# Patient Record
Sex: FEMALE | Race: WHITE
[De-identification: names, ages, dates, MRNs, and addresses within clinical notes are randomized per-mention and may not be internally consistent; named-entity substitution may affect disease eponyms.]

---

## 2019-03-19 PROBLEM — Z00.00 ENCOUNTER FOR PREVENTIVE HEALTH EXAMINATION: Status: ACTIVE | Noted: 2019-03-19

## 2019-08-13 ENCOUNTER — APPOINTMENT (OUTPATIENT)
Dept: ENDOCRINOLOGY | Facility: CLINIC | Age: 74
End: 2019-08-13
Payer: MEDICARE

## 2019-08-13 VITALS
DIASTOLIC BLOOD PRESSURE: 72 MMHG | WEIGHT: 184 LBS | BODY MASS INDEX: 33.86 KG/M2 | SYSTOLIC BLOOD PRESSURE: 128 MMHG | HEIGHT: 62 IN | HEART RATE: 92 BPM

## 2019-08-13 DIAGNOSIS — N28.9 DISORDER OF KIDNEY AND URETER, UNSPECIFIED: ICD-10-CM

## 2019-08-13 LAB
CREAT SERPL-MCNC: 1.21
GLUCOSE SERPL-MCNC: 98

## 2019-08-13 PROCEDURE — 99213 OFFICE O/P EST LOW 20 MIN: CPT

## 2019-08-13 NOTE — HISTORY OF PRESENT ILLNESS
[FreeTextEntry1] : Patient presents today for routine follow-up of hypothyroidism and thyroid nodules.\par Duration: 15 years\par Onset: found on routine blood work\par Severity: moderate\par Modifying factors: better with medication\par \par Current regimen:\par LT4 88 mcg daily\par Takes daily in AM with HCTZ.\par Nephrology just increased HCTZ from 12.5 mg daily to 25 mg daily due to increased swelling in B/L feet.\par \par Denies anterior neck pain, dysphagia, and voice changes.\par US done at PCP office (Dr Klein) in May 2017 showed right 3 mm hyperechoic nodule, multiple cystic nodules (largest on the left measuring 1.1 cm).\par Repeat ultrasound in our office on 1/28/2018: markedly heterogenous gland consistent with AITD; no discrete nodules.

## 2019-08-13 NOTE — ASSESSMENT
[FreeTextEntry1] : 73 year old female with hypothyroidism and history of thyroid nodules. She is currently euthyroid on replacement T4. Continue current dose of levothyroxine. Repeat TFTs and follow-up in office in one year. No need for repeat sonogram as most recent study did not show any discrete nodules.

## 2019-08-13 NOTE — REVIEW OF SYSTEMS
[Lower Ext Edema] : lower extremity edema [Shortness Of Breath] : shortness of breath [Swelling] : swelling [Recent Weight Gain (___ Lbs)] : recent [unfilled] ~Ulb weight gain [Dysphagia] : no dysphagia [Neck Pain] : no neck pain [Dysphonia] : no dysphonia [Chest Pain] : no chest pain [Palpitations] : no palpitations [Constipation] : no constipation [Diarrhea] : no diarrhea [Joint Pain] : no joint pain [Muscle Weakness] : no muscle weakness [Muscle Cramps] : no muscle cramps [Myalgia] : no myalgia  [Hair Loss] : no hair loss [Dry Skin] : no dry skin [Tremors] : no tremors [Anxiety] : no anxiety [Depression] : no depression [Cold Intolerance] : cold tolerant [Heat Intolerance] : heat tolerant

## 2019-08-27 ENCOUNTER — RECORD ABSTRACTING (OUTPATIENT)
Age: 74
End: 2019-08-27

## 2019-08-27 DIAGNOSIS — Z80.42 FAMILY HISTORY OF MALIGNANT NEOPLASM OF PROSTATE: ICD-10-CM

## 2019-08-27 DIAGNOSIS — N95.2 POSTMENOPAUSAL ATROPHIC VAGINITIS: ICD-10-CM

## 2019-08-27 DIAGNOSIS — Z87.891 PERSONAL HISTORY OF NICOTINE DEPENDENCE: ICD-10-CM

## 2019-08-27 DIAGNOSIS — Z63.4 DISAPPEARANCE AND DEATH OF FAMILY MEMBER: ICD-10-CM

## 2019-08-27 DIAGNOSIS — Z87.39 PERSONAL HISTORY OF OTHER DISEASES OF THE MUSCULOSKELETAL SYSTEM AND CONNECTIVE TISSUE: ICD-10-CM

## 2019-08-27 DIAGNOSIS — Z87.59 PERSONAL HISTORY OF OTHER COMPLICATIONS OF PREGNANCY, CHILDBIRTH AND THE PUERPERIUM: ICD-10-CM

## 2019-08-27 LAB — CYTOLOGY CVX/VAG DOC THIN PREP: NORMAL

## 2019-08-27 SDOH — SOCIAL STABILITY - SOCIAL INSECURITY: DISSAPEARANCE AND DEATH OF FAMILY MEMBER: Z63.4

## 2019-09-03 ENCOUNTER — APPOINTMENT (OUTPATIENT)
Dept: OBGYN | Facility: CLINIC | Age: 74
End: 2019-09-03
Payer: MEDICARE

## 2019-09-03 VITALS
HEIGHT: 62 IN | DIASTOLIC BLOOD PRESSURE: 80 MMHG | SYSTOLIC BLOOD PRESSURE: 124 MMHG | BODY MASS INDEX: 33.13 KG/M2 | WEIGHT: 180 LBS

## 2019-09-03 DIAGNOSIS — Z92.89 PERSONAL HISTORY OF OTHER MEDICAL TREATMENT: ICD-10-CM

## 2019-09-03 DIAGNOSIS — Z82.62 FAMILY HISTORY OF OSTEOPOROSIS: ICD-10-CM

## 2019-09-03 DIAGNOSIS — Z78.9 OTHER SPECIFIED HEALTH STATUS: ICD-10-CM

## 2019-09-03 DIAGNOSIS — Z12.39 ENCOUNTER FOR OTHER SCREENING FOR MALIGNANT NEOPLASM OF BREAST: ICD-10-CM

## 2019-09-03 DIAGNOSIS — Z01.419 ENCOUNTER FOR GYNECOLOGICAL EXAMINATION (GENERAL) (ROUTINE) W/OUT ABNORMAL FINDINGS: ICD-10-CM

## 2019-09-03 DIAGNOSIS — R92.8 OTHER ABNORMAL AND INCONCLUSIVE FINDINGS ON DIAGNOSTIC IMAGING OF BREAST: ICD-10-CM

## 2019-09-03 DIAGNOSIS — Z12.11 ENCOUNTER FOR SCREENING FOR MALIGNANT NEOPLASM OF COLON: ICD-10-CM

## 2019-09-03 LAB
BILIRUB UR QL STRIP: NORMAL
DATE COLLECTED: NORMAL
GLUCOSE UR-MCNC: NORMAL
HCG UR QL: 0.2 EU/DL
HEMOCCULT SP1 STL QL: NEGATIVE
HGB UR QL STRIP.AUTO: NORMAL
KETONES UR-MCNC: NORMAL
LEUKOCYTE ESTERASE UR QL STRIP: NORMAL
NITRITE UR QL STRIP: NORMAL
PH UR STRIP: 5
PROT UR STRIP-MCNC: NORMAL
QUALITY CONTROL: YES
SP GR UR STRIP: 1.02

## 2019-09-03 PROCEDURE — G0101: CPT

## 2019-09-03 PROCEDURE — 81003 URINALYSIS AUTO W/O SCOPE: CPT | Mod: QW

## 2019-09-03 PROCEDURE — 82270 OCCULT BLOOD FECES: CPT

## 2019-09-03 RX ORDER — HYDROCHLOROTHIAZIDE 25 MG/1
25 TABLET ORAL DAILY
Refills: 0 | Status: DISCONTINUED | COMMUNITY
End: 2019-09-03

## 2019-09-03 NOTE — HISTORY OF PRESENT ILLNESS
[1 Year Ago] : 1 year ago [Good] : being in good health [Healthy Diet] : a healthy diet [Regular Exercise] : regular exercise [Last Mammogram ___] : Last Mammogram was [unfilled] [Last Bone Density ___] : Last bone density studies [unfilled] [Last Colonoscopy ___] : Last colonoscopy [unfilled] [Last Pap ___] : Last cervical pap smear was [unfilled] [Postmenopausal] : is postmenopausal [Pregnancy History] : pregnancy history: [Total Preg ___] : [unfilled] [Full Term ___] : [unfilled] [Living ___] : [unfilled] [AB Spont ___] : miscarriages: [unfilled] [Definite:  ___ (Date)] : the last menstrual period was [unfilled] [Menarche Age: ____] : age at menarche was [unfilled] [Weight Concerns] : no concerns with her weight [de-identified] : b u/s 03/2019 [Hot Flashes] : no hot flashes [Night Sweats] : no night sweats [Vaginal Itching] : no vaginal itching [Mood Changes] : no mood changes [Dyspareunia] : no dyspareunia [Sexually Active] : is not sexually active

## 2019-09-03 NOTE — PHYSICAL EXAM
[Awake] : awake [Alert] : alert [Examination Of The Breasts] : a normal appearance [No Discharge] : no discharge [No Masses] : no breast masses were palpable [Soft] : soft [Oriented x3] : oriented to person, place, and time [Labia Majora] : labia major [Labia Minora] : labia minora [Normal] : clitoris [Atrophy] : atrophy [Dry Mucosa] : dry mucosa [No Bleeding] : there was no active vaginal bleeding [Pap Obtained] : a Pap smear was performed [Uterine Adnexae] : were not tender and not enlarged [No Tenderness] : no rectal tenderness [Nl Sphincter Tone] : normal sphincter tone [Acute Distress] : no acute distress [Mass] : no breast mass [Nipple Discharge] : no nipple discharge [Axillary LAD] : no axillary lymphadenopathy [Tender] : non tender [Distended] : not distended [H/Smegaly] : no hepatosplenomegaly [Depressed Mood] : not depressed [Flat Affect] : affect not flat [Occult Blood] : occult blood test from digital rectal exam was negative

## 2019-09-03 NOTE — END OF VISIT
[FreeTextEntry3] : I, Tommy Hurtado, acted solely as a scribe for Dr. Strange on this date 09/03/2019.\par All medical record entries made by the Scribe were at my, Dr. Strange’s direction and personally dictated by me on  09/03/2019. I have reviewed the chart and agree that the record accurately reflects my personal performance of the history, physical exam, assessment and plan. I have also personally directed, reviewed, and agreed with the chart.\par \par

## 2019-09-08 LAB — CYTOLOGY CVX/VAG DOC THIN PREP: ABNORMAL

## 2019-12-30 ENCOUNTER — APPOINTMENT (OUTPATIENT)
Age: 74
End: 2019-12-30

## 2020-08-11 ENCOUNTER — APPOINTMENT (OUTPATIENT)
Dept: ENDOCRINOLOGY | Facility: CLINIC | Age: 75
End: 2020-08-11

## 2020-08-18 ENCOUNTER — RX RENEWAL (OUTPATIENT)
Age: 75
End: 2020-08-18

## 2020-12-21 PROBLEM — Z01.419 ENCOUNTER FOR ANNUAL ROUTINE GYNECOLOGICAL EXAMINATION: Status: RESOLVED | Noted: 2019-09-03 | Resolved: 2020-12-21

## 2020-12-22 ENCOUNTER — APPOINTMENT (OUTPATIENT)
Dept: ENDOCRINOLOGY | Facility: CLINIC | Age: 75
End: 2020-12-22
Payer: MEDICARE

## 2020-12-22 VITALS
WEIGHT: 180 LBS | BODY MASS INDEX: 33.13 KG/M2 | DIASTOLIC BLOOD PRESSURE: 80 MMHG | HEIGHT: 62 IN | OXYGEN SATURATION: 97 % | SYSTOLIC BLOOD PRESSURE: 120 MMHG | HEART RATE: 91 BPM

## 2020-12-22 DIAGNOSIS — Z86.79 PERSONAL HISTORY OF OTHER DISEASES OF THE CIRCULATORY SYSTEM: ICD-10-CM

## 2020-12-22 DIAGNOSIS — Z87.448 PERSONAL HISTORY OF OTHER DISEASES OF URINARY SYSTEM: ICD-10-CM

## 2020-12-22 PROCEDURE — 99213 OFFICE O/P EST LOW 20 MIN: CPT

## 2020-12-22 RX ORDER — HYDROCHLOROTHIAZIDE 12.5 MG/1
12.5 CAPSULE ORAL
Refills: 0 | Status: DISCONTINUED | COMMUNITY
End: 2020-12-22

## 2020-12-22 RX ORDER — HYDROCHLOROTHIAZIDE 25 MG/1
25 TABLET ORAL
Refills: 0 | Status: ACTIVE | COMMUNITY

## 2020-12-22 NOTE — PHYSICAL EXAM
[Healthy Appearance] : healthy appearance [No Acute Distress] : no acute distress [No Neck Mass] : no neck mass was observed [No LAD] : no lymphadenopathy [Supple] : the neck was supple [Thyroid Not Enlarged] : the thyroid was not enlarged [No Thyroid Nodules] : no palpable thyroid nodules [No Respiratory Distress] : no respiratory distress [Clear to Auscultation] : lungs were clear to auscultation bilaterally [Normal S1, S2] : normal S1 and S2 [No Murmurs] : no murmurs [Normal Rate] : heart rate was normal [Regular Rhythm] : with a regular rhythm [Normal Affect] : the affect was normal [Normal Insight/Judgement] : insight and judgment were intact [Normal Mood] : the mood was normal [Acanthosis Nigricans] : no acanthosis nigricans

## 2020-12-22 NOTE — ASSESSMENT
[FreeTextEntry1] : 75 year old female with primary hypothyroidism.   She is clinically and biochemically euthyroid on LT4.\par \par Continue current dose of LT4.  Repeat TFTs in one year.   \par No need for repeat thyroid imaging as last study in 2018 showed no discrete nodules.

## 2020-12-22 NOTE — REVIEW OF SYSTEMS
[Recent Weight Gain (___ Lbs)] : recent weight gain: [unfilled] lbs [Dry Skin] : dry skin [Fatigue] : no fatigue [Neck Pain] : no neck pain [Palpitations] : no palpitations [Constipation] : no constipation

## 2020-12-22 NOTE — HISTORY OF PRESENT ILLNESS
[FreeTextEntry1] : Follow up of hypothyroidism.\par \par History of thyroid nodules, including left 1.1 cm cyst, however last imaging done in 2018 showed no discrete nodules.  \par \par Quality:  primary hypothyroidism \par Duration: 15 years\par Onset: found on routine blood work\par Severity: moderate\par Modifying factors: better with medication\par Associated symptoms:  no fatigue,  \par \par Current regimen:\par LT4 88 mcg daily

## 2021-12-22 ENCOUNTER — APPOINTMENT (OUTPATIENT)
Dept: ENDOCRINOLOGY | Facility: CLINIC | Age: 76
End: 2021-12-22
Payer: MEDICARE

## 2021-12-22 VITALS
SYSTOLIC BLOOD PRESSURE: 128 MMHG | BODY MASS INDEX: 33.49 KG/M2 | WEIGHT: 182 LBS | HEIGHT: 62 IN | HEART RATE: 84 BPM | DIASTOLIC BLOOD PRESSURE: 76 MMHG | OXYGEN SATURATION: 98 %

## 2021-12-22 PROCEDURE — 99214 OFFICE O/P EST MOD 30 MIN: CPT

## 2021-12-22 NOTE — ASSESSMENT
[FreeTextEntry1] : 76 year old female with primary hypothyroidism. Her hypothyroidism has worsened, which could be related to missing some doses of LT4 before the blood test. \par \par Improve compliance with LT4. \par Will repeat TFTs in 4-6 weeks and if TSH is still elevated will increase dose of LT4.\par Follow up in 6 months.

## 2021-12-22 NOTE — HISTORY OF PRESENT ILLNESS
[FreeTextEntry1] : Follow up of hypothyroidism.\par History of thyroid nodules, including left 1.1 cm cyst, however last imaging done in 2018 showed no discrete nodules.  \par \par Quality:  primary hypothyroidism \par Duration: 15 years\par Onset: found on routine blood work\par Severity: moderate\par Modifying factors: better with medication\par Associated symptoms:  denies any associated fatigue.  C/o weight gain.\par \par Current regimen:\par LT4 88 mcg daily (missed 3 days before last blood test).

## 2021-12-22 NOTE — DATA REVIEWED
[FreeTextEntry1] : Thyroid US 1/18/2018:  heterogeneous gland without discrete nodules\par \par Thyroid US 5/24/2017:\par Left 1.1 cm cyst, right 0.3 cm hyperechoic nodule\par \par LABS:  \par 12/16/2021:\par \par HDL 51\par TSH 6.52\par TT4  9.4\par \par 12/18/2020:\par TSH  3/27\par Free T4  1.4

## 2021-12-22 NOTE — REVIEW OF SYSTEMS
[Diarrhea] : diarrhea [Fatigue] : no fatigue [Constipation] : no constipation [Hair Loss] : no hair loss [Cold Intolerance] : no cold intolerance

## 2021-12-22 NOTE — PHYSICAL EXAM
[Healthy Appearance] : healthy appearance [No Acute Distress] : no acute distress [Normal Sclera/Conjunctiva] : normal sclera/conjunctiva [No Lid Lag] : no lid lag [No Neck Mass] : no neck mass was observed [No LAD] : no lymphadenopathy [Supple] : the neck was supple [Thyroid Not Enlarged] : the thyroid was not enlarged [No Thyroid Nodules] : no palpable thyroid nodules [No Respiratory Distress] : no respiratory distress [Clear to Auscultation] : lungs were clear to auscultation bilaterally [Normal S1, S2] : normal S1 and S2 [No Murmurs] : no murmurs [Normal Rate] : heart rate was normal [Regular Rhythm] : with a regular rhythm [Normal Gait] : normal gait [Acanthosis Nigricans] : no acanthosis nigricans [Normal Affect] : the affect was normal [Normal Insight/Judgement] : insight and judgment were intact [Normal Mood] : the mood was normal

## 2022-01-05 ENCOUNTER — TRANSCRIPTION ENCOUNTER (OUTPATIENT)
Age: 77
End: 2022-01-05

## 2022-01-17 ENCOUNTER — RX RENEWAL (OUTPATIENT)
Age: 77
End: 2022-01-17

## 2022-01-28 ENCOUNTER — TRANSCRIPTION ENCOUNTER (OUTPATIENT)
Age: 77
End: 2022-01-28

## 2022-06-22 ENCOUNTER — APPOINTMENT (OUTPATIENT)
Dept: ENDOCRINOLOGY | Facility: CLINIC | Age: 77
End: 2022-06-22
Payer: MEDICARE

## 2022-06-22 VITALS
OXYGEN SATURATION: 97 % | DIASTOLIC BLOOD PRESSURE: 66 MMHG | SYSTOLIC BLOOD PRESSURE: 118 MMHG | WEIGHT: 188 LBS | HEIGHT: 62 IN | BODY MASS INDEX: 34.6 KG/M2 | HEART RATE: 84 BPM

## 2022-06-22 DIAGNOSIS — R73.01 IMPAIRED FASTING GLUCOSE: ICD-10-CM

## 2022-06-22 PROCEDURE — 99214 OFFICE O/P EST MOD 30 MIN: CPT

## 2022-06-22 NOTE — ASSESSMENT
[FreeTextEntry1] : 76 year old female with primary hypothyroidism. and history of thyroid cysts.  She is clinically and biochemically euthyroid on LT4 therapy.\par \par 1.  hypothyroidism-  continue current dose of LT4.    \par 2.  History of thyroid cysts-  check thyroid US now.  \par 3.  Impaired fasting glucose-  advised increased exercise and weight loss.  Check A1c with next labs.  \par \par If stable, can follow up in one year.

## 2022-06-22 NOTE — DATA REVIEWED
[FreeTextEntry1] : Thyroid US 1/18/2018:  heterogeneous gland without discrete nodules\par \par Thyroid US 5/24/2017:\par Left 1.1 cm cyst, right 0.3 cm hyperechoic nodule\par \par LABS:  \par 6/17/2022:\par TSH 1.95\par Free T4  1.5\par Creatinine 1\par Glucose 105\par \par 12/16/2021:\par \par HDL 51\par TSH 6.52\par TT4  9.4\par \par 12/18/2020:\par TSH  3/27\par Free T4  1.4

## 2022-06-22 NOTE — REVIEW OF SYSTEMS
[Recent Weight Gain (___ Lbs)] : recent weight gain: [unfilled] lbs [Fatigue] : no fatigue [Neck Pain] : no neck pain [Constipation] : no constipation [Dry Skin] : no dry skin [Hair Loss] : no hair loss

## 2022-06-22 NOTE — HISTORY OF PRESENT ILLNESS
[FreeTextEntry1] : Follow up of hypothyroidism.\par History of thyroid nodules, including left 1.1 cm cyst, however last imaging done in 2018 showed no discrete nodules.  \par \par Quality:  primary hypothyroidism \par Duration: 15 years\par Onset: found on routine blood work\par Severity: moderate\par Modifying factors: better with medication\par Associated symptoms:  denies any associated fatigue.  \par \par Current regimen:\par LT4 88 mcg daily

## 2022-06-23 ENCOUNTER — APPOINTMENT (OUTPATIENT)
Dept: ENDOCRINOLOGY | Facility: CLINIC | Age: 77
End: 2022-06-23

## 2022-06-23 DIAGNOSIS — E04.1 NONTOXIC SINGLE THYROID NODULE: ICD-10-CM

## 2022-06-23 PROCEDURE — 76536 US EXAM OF HEAD AND NECK: CPT

## 2022-07-04 PROBLEM — E04.1 THYROID NODULE: Status: ACTIVE | Noted: 2019-08-27

## 2022-07-04 NOTE — IMPRESSION
[FreeTextEntry1] : Markedly heterogeneous gland with no discrete nodules visualized, similar to findings in 2018.

## 2022-07-04 NOTE — PROCEDURE
[Other: ___] : [unfilled]. All measurements will be reported as longitudinal x marisol-posterior x transverse. [Report dated ___] : Report dated [unfilled] [Thyroid Nodule] : thyroid nodule [There are no distinct nodules visualized.] : There are no distinct nodules visualized. [] : a markedly heterogeneous parenchyma [FreeTextEntry1] : 3.1 x 1.5 x 1.3 [FreeTextEntry5] : 3.7 x 1.5 x 1.3 [FreeTextEntry2] : 0.2

## 2023-03-06 ENCOUNTER — RX RENEWAL (OUTPATIENT)
Age: 78
End: 2023-03-06

## 2023-06-06 LAB
HBA1C MFR BLD HPLC: 5.2
TSH SERPL-ACNC: 1.24

## 2023-06-07 ENCOUNTER — APPOINTMENT (OUTPATIENT)
Dept: ENDOCRINOLOGY | Facility: CLINIC | Age: 78
End: 2023-06-07
Payer: MEDICARE

## 2023-06-07 VITALS
SYSTOLIC BLOOD PRESSURE: 130 MMHG | DIASTOLIC BLOOD PRESSURE: 76 MMHG | HEIGHT: 62 IN | WEIGHT: 186 LBS | BODY MASS INDEX: 34.23 KG/M2 | HEART RATE: 86 BPM | OXYGEN SATURATION: 98 %

## 2023-06-07 DIAGNOSIS — E66.9 OBESITY, UNSPECIFIED: ICD-10-CM

## 2023-06-07 DIAGNOSIS — Z99.89 OBSTRUCTIVE SLEEP APNEA (ADULT) (PEDIATRIC): ICD-10-CM

## 2023-06-07 DIAGNOSIS — G47.33 OBSTRUCTIVE SLEEP APNEA (ADULT) (PEDIATRIC): ICD-10-CM

## 2023-06-07 DIAGNOSIS — E03.9 HYPOTHYROIDISM, UNSPECIFIED: ICD-10-CM

## 2023-06-07 PROCEDURE — 99214 OFFICE O/P EST MOD 30 MIN: CPT

## 2023-06-07 NOTE — REVIEW OF SYSTEMS
[Fatigue] : no fatigue [Recent Weight Loss (___ Lbs)] : no recent weight loss [Palpitations] : no palpitations [Hair Loss] : no hair loss [Cold Intolerance] : no cold intolerance

## 2023-06-07 NOTE — ASSESSMENT
[FreeTextEntry1] : 77 year old female with primary hypothyroidism and obesity here for follow up.  She is clinically and biochemically euthyroid on LT4 therapy.\par \par 1.  hypothyroidism-  continue current dose of LT4.     Will repeat labs (see below) in one year. \par 2.  Obesity-  we discussed lifestyle modification.  We discussed that GLp-1 RA therapy for weight loss is not currently covered under medicare.  Check lipids with next labs.  A1c recently normal.  \par \par Follow up in one year.

## 2023-06-07 NOTE — PHYSICAL EXAM
[Healthy Appearance] : healthy appearance [No Acute Distress] : no acute distress [Normal Sclera/Conjunctiva] : normal sclera/conjunctiva [No Lid Lag] : no lid lag [No Neck Mass] : no neck mass was observed [No LAD] : no lymphadenopathy [Supple] : the neck was supple [Thyroid Not Enlarged] : the thyroid was not enlarged [No Thyroid Nodules] : no palpable thyroid nodules [No Respiratory Distress] : no respiratory distress [Clear to Auscultation] : lungs were clear to auscultation bilaterally [Normal S1, S2] : normal S1 and S2 [No Murmurs] : no murmurs [Normal Rate] : heart rate was normal [Regular Rhythm] : with a regular rhythm [Normal Gait] : normal gait [Normal Affect] : the affect was normal [Normal Insight/Judgement] : insight and judgment were intact [Normal Mood] : the mood was normal [Acanthosis Nigricans] : no acanthosis nigricans

## 2023-06-07 NOTE — HISTORY OF PRESENT ILLNESS
[FreeTextEntry1] : Follow up of hypothyroidism.\par History of thyroid nodules, including left 1.1 cm cyst, however last imaging done in 2018 and 2022 showed no discrete nodules.  \par \par Quality:  primary hypothyroidism \par Duration: over 15 years\par Onset: found on routine blood work\par Severity: moderate\par Modifying factors: better with medication\par  \par Current regimen:\par LT4 88 mcg daily \par \par Complains of difficulty losing weight.  Denies associated fatigue.

## 2024-04-05 NOTE — DATA REVIEWED
[FreeTextEntry1] : Thyroid US 6/23/2022:\par The right thyroid lobe measures 3.1 x 1.5 x 1.3 cm. The left thyroid lobe measures 3.7 x 1.5 x 1.3 cm. The isthmus measures 0.2 cm. \par The right thyroid lobe has a markedly heterogeneous parenchyma. \par The left thyroid lobe has a markedly heterogeneous parenchyma. \par \par \par Thyroid US 1/18/2018:  heterogeneous gland without discrete nodules\par \par Thyroid US 5/24/2017:\par Left 1.1 cm cyst, right 0.3 cm hyperechoic nodule\par \par LABS:  \par 6/2/2023:\par Glucose 96\par A1c 5.2\par TSH 1.24\par \par 6/17/2022:\par TSH 1.95\par Free T4  1.5\par Creatinine 1\par Glucose 105\par \par 12/16/2021:\par \par HDL 51\par TSH 6.52\par TT4  9.4\par \par 12/18/2020:\par TSH  3/27\par Free T4  1.4
2

## 2024-06-05 RX ORDER — LEVOTHYROXINE SODIUM 0.09 MG/1
88 TABLET ORAL
Qty: 90 | Refills: 0 | Status: ACTIVE | COMMUNITY
Start: 2020-08-18 | End: 1900-01-01

## 2024-09-11 ENCOUNTER — NON-APPOINTMENT (OUTPATIENT)
Age: 79
End: 2024-09-11

## 2024-09-11 DIAGNOSIS — Z60.2 PROBLEMS RELATED TO LIVING ALONE: ICD-10-CM

## 2024-09-11 DIAGNOSIS — R60.0 LOCALIZED EDEMA: ICD-10-CM

## 2024-09-11 DIAGNOSIS — I10 ESSENTIAL (PRIMARY) HYPERTENSION: ICD-10-CM

## 2024-09-11 DIAGNOSIS — N18.2 CHRONIC KIDNEY DISEASE, STAGE 2 (MILD): ICD-10-CM

## 2024-09-11 DIAGNOSIS — D75.1 SECONDARY POLYCYTHEMIA: ICD-10-CM

## 2024-09-11 RX ORDER — FUROSEMIDE 20 MG/1
20 TABLET ORAL DAILY
Refills: 0 | Status: ACTIVE | COMMUNITY

## 2024-09-11 SDOH — SOCIAL STABILITY - SOCIAL INSECURITY: PROBLEMS RELATED TO LIVING ALONE: Z60.2

## 2024-09-29 ENCOUNTER — NON-APPOINTMENT (OUTPATIENT)
Age: 79
End: 2024-09-29

## 2024-09-30 ENCOUNTER — APPOINTMENT (OUTPATIENT)
Dept: ENDOCRINOLOGY | Facility: CLINIC | Age: 79
End: 2024-09-30
Payer: MEDICARE

## 2024-09-30 VITALS
HEIGHT: 62 IN | HEART RATE: 68 BPM | DIASTOLIC BLOOD PRESSURE: 62 MMHG | BODY MASS INDEX: 34.8 KG/M2 | WEIGHT: 189.13 LBS | OXYGEN SATURATION: 98 % | TEMPERATURE: 98 F | SYSTOLIC BLOOD PRESSURE: 110 MMHG | RESPIRATION RATE: 16 BRPM

## 2024-09-30 DIAGNOSIS — M85.88 OTHER SPECIFIED DISORDERS OF BONE DENSITY AND STRUCTURE, OTHER SITE: ICD-10-CM

## 2024-09-30 PROCEDURE — 99214 OFFICE O/P EST MOD 30 MIN: CPT

## 2024-09-30 NOTE — HISTORY OF PRESENT ILLNESS
[FreeTextEntry1] : Follow up of hypothyroidism.  History of thyroid nodules, including left 1.1 cm cyst, however last imaging done in 2018 and 2022 showed no discrete nodules.    Diagnosed with primary hypothyroidism over 15 years ago, which was found on routine blood work.  Her hypothyroidism has improved with LT4 therapy.      Current regimen: LT4 88 mcg daily    Denies any associated fatigue, skin or hair changes.

## 2024-09-30 NOTE — DATA REVIEWED
[FreeTextEntry1] : Thyroid US 6/23/2022: The right thyroid lobe measures 3.1 x 1.5 x 1.3 cm. The left thyroid lobe measures 3.7 x 1.5 x 1.3 cm. The isthmus measures 0.2 cm.  The right thyroid lobe has a markedly heterogeneous parenchyma.  The left thyroid lobe has a markedly heterogeneous parenchyma.   Thyroid US 1/18/2018:  heterogeneous gland without discrete nodules  Thyroid US 5/24/2017: Left 1.1 cm cyst, right 0.3 cm hyperechoic nodule  LABS:    Glucose 103 TSH 2.32 Free T4  1.4  6/2/2023: Glucose 96 A1c 5.2 TSH 1.24  6/17/2022: TSH 1.95 Free T4  1.5 Creatinine 1 Glucose 105

## 2024-09-30 NOTE — ASSESSMENT
[FreeTextEntry1] : 78 year old female with primary hypothyroidism and obesity here for follow up.  She is clinically and biochemically euthyroid on LT4 therapy.  1.  hypothyroidism-  continue current dose of LT4.     2.  Osteopenia-  Last DXA on file at [facility] was 2015 showing normal BMD, but there is a family history of OP (mother).  She will check with PCP to see when last scan was done.  If nothing within the last 2 years, would recommend repeating now.    Follow up in one year.

## 2024-10-07 ENCOUNTER — APPOINTMENT (OUTPATIENT)
Age: 79
End: 2024-10-07
Payer: MEDICARE

## 2024-10-07 VITALS
HEART RATE: 77 BPM | BODY MASS INDEX: 34.04 KG/M2 | SYSTOLIC BLOOD PRESSURE: 123 MMHG | HEIGHT: 62 IN | DIASTOLIC BLOOD PRESSURE: 71 MMHG | WEIGHT: 185 LBS

## 2024-10-07 DIAGNOSIS — I10 ESSENTIAL (PRIMARY) HYPERTENSION: ICD-10-CM

## 2024-10-07 DIAGNOSIS — E04.1 NONTOXIC SINGLE THYROID NODULE: ICD-10-CM

## 2024-10-07 DIAGNOSIS — N18.2 CHRONIC KIDNEY DISEASE, STAGE 2 (MILD): ICD-10-CM

## 2024-10-07 DIAGNOSIS — R60.0 LOCALIZED EDEMA: ICD-10-CM

## 2024-10-07 DIAGNOSIS — E03.9 HYPOTHYROIDISM, UNSPECIFIED: ICD-10-CM

## 2024-10-07 DIAGNOSIS — N28.9 DISORDER OF KIDNEY AND URETER, UNSPECIFIED: ICD-10-CM

## 2024-10-07 DIAGNOSIS — D75.1 SECONDARY POLYCYTHEMIA: ICD-10-CM

## 2024-10-07 PROCEDURE — 99214 OFFICE O/P EST MOD 30 MIN: CPT

## 2024-10-07 PROCEDURE — 99204 OFFICE O/P NEW MOD 45 MIN: CPT

## 2025-04-07 ENCOUNTER — APPOINTMENT (OUTPATIENT)
Age: 80
End: 2025-04-07

## 2025-08-04 ENCOUNTER — APPOINTMENT (OUTPATIENT)
Age: 80
End: 2025-08-04
Payer: MEDICARE

## 2025-08-04 VITALS
BODY MASS INDEX: 32.76 KG/M2 | SYSTOLIC BLOOD PRESSURE: 147 MMHG | WEIGHT: 178 LBS | DIASTOLIC BLOOD PRESSURE: 82 MMHG | HEIGHT: 62 IN | HEART RATE: 74 BPM

## 2025-08-04 DIAGNOSIS — I10 ESSENTIAL (PRIMARY) HYPERTENSION: ICD-10-CM

## 2025-08-04 DIAGNOSIS — N18.2 CHRONIC KIDNEY DISEASE, STAGE 2 (MILD): ICD-10-CM

## 2025-08-04 DIAGNOSIS — N28.9 DISORDER OF KIDNEY AND URETER, UNSPECIFIED: ICD-10-CM

## 2025-08-04 PROCEDURE — 99214 OFFICE O/P EST MOD 30 MIN: CPT
